# Patient Record
Sex: FEMALE | Race: WHITE | Employment: UNEMPLOYED | URBAN - METROPOLITAN AREA
[De-identification: names, ages, dates, MRNs, and addresses within clinical notes are randomized per-mention and may not be internally consistent; named-entity substitution may affect disease eponyms.]

---

## 2024-11-06 ENCOUNTER — HOSPITAL ENCOUNTER (EMERGENCY)
Age: 89
Discharge: HOSPICE | End: 2024-11-07
Attending: EMERGENCY MEDICINE
Payer: MEDICARE

## 2024-11-06 VITALS
TEMPERATURE: 98.3 F | WEIGHT: 94.3 LBS | HEART RATE: 83 BPM | SYSTOLIC BLOOD PRESSURE: 115 MMHG | HEIGHT: 62 IN | OXYGEN SATURATION: 93 % | RESPIRATION RATE: 18 BRPM | DIASTOLIC BLOOD PRESSURE: 53 MMHG | BODY MASS INDEX: 17.35 KG/M2

## 2024-11-06 DIAGNOSIS — S01.81XA FACIAL LACERATION, INITIAL ENCOUNTER: Primary | ICD-10-CM

## 2024-11-06 PROCEDURE — 6370000000 HC RX 637 (ALT 250 FOR IP): Performed by: EMERGENCY MEDICINE

## 2024-11-06 PROCEDURE — 99283 EMERGENCY DEPT VISIT LOW MDM: CPT

## 2024-11-06 PROCEDURE — 12011 RPR F/E/E/N/L/M 2.5 CM/<: CPT

## 2024-11-06 RX ORDER — GINSENG 100 MG
CAPSULE ORAL ONCE
Status: COMPLETED | OUTPATIENT
Start: 2024-11-06 | End: 2024-11-06

## 2024-11-06 RX ADMIN — BACITRACIN 1 EACH: 500 OINTMENT TOPICAL at 23:08

## 2024-11-06 ASSESSMENT — PAIN - FUNCTIONAL ASSESSMENT: PAIN_FUNCTIONAL_ASSESSMENT: ADULT NONVERBAL PAIN SCALE (NPVS)

## 2024-11-07 NOTE — ED PROVIDER NOTES
CHIEF COMPLAINT    Chief Complaint   Patient presents with    Laceration    Fall     Pt brought in by EMS from nursing home after pt reportedly fell out of bed and hit her forehead causing a laceration.      DALE Bruner is a 89 y.o. female who presents to the ED with history of dementia presents to the emergency department via EMS from her living facility reports of an unwitnessed fall.  Patient is DNR [facility] but was sent here after unwitnessed fall out of bed with subsequent laceration to right frontal region.  At baseline she is minimally verbal and does not follow commands.  She is DNR [facility] and simply here for laceration repair.  Patient arrives unable to provide any history.  Given she cannot provide any history she is unable to tell me about any alleviating or aggravating factors.      REVIEW OF SYSTEMS  Unable to obtain secondary to patient mental status  ?  PAST MEDICAL HISTORY  History reviewed. No pertinent past medical history.  FAMILY HISTORY  History reviewed. No pertinent family history.  SOCIAL HISTORY  Social History     Socioeconomic History    Marital status:      Spouse name: None    Number of children: None    Years of education: None    Highest education level: None   Tobacco Use    Smoking status: Never    Smokeless tobacco: Never   Vaping Use    Vaping status: Never Used   Substance and Sexual Activity    Alcohol use: Never    Drug use: Never       SURGICAL HISTORY  History reviewed. No pertinent surgical history.  CURRENT MEDICATIONS  Previous Medications    No medications on file     ALLERGIES  Allergies   Allergen Reactions    Sulfa Antibiotics        Nursing notes reviewed by myself for past medical history, family history, social history, surgical history, current medications, and allergies.    PHYSICAL EXAM  VITAL SIGNS: Triage VS:    ED Triage Vitals [11/06/24 2251]   Encounter Vitals Group      BP (!) 115/53      Systolic BP Percentile       Diastolic BP Percentile       Pulse